# Patient Record
Sex: MALE | Race: WHITE | NOT HISPANIC OR LATINO | Employment: UNEMPLOYED | RURAL
[De-identification: names, ages, dates, MRNs, and addresses within clinical notes are randomized per-mention and may not be internally consistent; named-entity substitution may affect disease eponyms.]

---

## 2022-06-09 ENCOUNTER — HOSPITAL ENCOUNTER (INPATIENT)
Facility: HOSPITAL | Age: 37
LOS: 2 days | Discharge: HOME | DRG: 392 | End: 2022-06-12
Attending: EMERGENCY MEDICINE | Admitting: INTERNAL MEDICINE

## 2022-06-09 DIAGNOSIS — R07.9 CHEST PAIN: ICD-10-CM

## 2022-06-09 DIAGNOSIS — A09 INFECTIOUS DIARRHEA: ICD-10-CM

## 2022-06-09 DIAGNOSIS — A41.9 SEPSIS: ICD-10-CM

## 2022-06-09 DIAGNOSIS — F19.90 IVDU (INTRAVENOUS DRUG USER): ICD-10-CM

## 2022-06-09 DIAGNOSIS — R50.9 FEVER OF UNKNOWN ORIGIN: ICD-10-CM

## 2022-06-09 LAB
ALBUMIN SERPL BCP-MCNC: 3 G/DL (ref 3.5–5)
ALBUMIN/GLOB SERPL: 0.7 {RATIO}
ALP SERPL-CCNC: 152 U/L (ref 45–115)
ALT SERPL W P-5'-P-CCNC: 52 U/L (ref 16–61)
ANION GAP SERPL CALCULATED.3IONS-SCNC: 9 MMOL/L (ref 7–16)
APTT PPP: 29.2 SECONDS (ref 25.2–37.3)
AST SERPL W P-5'-P-CCNC: 33 U/L (ref 15–37)
BASOPHILS # BLD AUTO: 0.03 K/UL (ref 0–0.2)
BASOPHILS NFR BLD AUTO: 0.2 % (ref 0–1)
BILIRUB SERPL-MCNC: 0.4 MG/DL (ref 0–1.2)
BILIRUB UR QL STRIP: NEGATIVE
BUN SERPL-MCNC: 6 MG/DL (ref 7–18)
BUN/CREAT SERPL: 8 (ref 6–20)
CALCIUM SERPL-MCNC: 8.8 MG/DL (ref 8.5–10.1)
CHLORIDE SERPL-SCNC: 101 MMOL/L (ref 98–107)
CLARITY UR: CLEAR
CO2 SERPL-SCNC: 27 MMOL/L (ref 21–32)
COLOR UR: YELLOW
CREAT SERPL-MCNC: 0.79 MG/DL (ref 0.7–1.3)
CRP SERPL-MCNC: 8.34 MG/DL (ref 0–0.8)
DIFFERENTIAL METHOD BLD: ABNORMAL
EOSINOPHIL # BLD AUTO: 0.03 K/UL (ref 0–0.5)
EOSINOPHIL NFR BLD AUTO: 0.2 % (ref 1–4)
ERYTHROCYTE [DISTWIDTH] IN BLOOD BY AUTOMATED COUNT: 13.2 % (ref 11.5–14.5)
ERYTHROCYTE [SEDIMENTATION RATE] IN BLOOD BY WESTERGREN METHOD: 38 MM/HR (ref 0–15)
FLUAV AG UPPER RESP QL IA.RAPID: NEGATIVE
FLUBV AG UPPER RESP QL IA.RAPID: NEGATIVE
GLOBULIN SER-MCNC: 4.2 G/DL (ref 2–4)
GLUCOSE SERPL-MCNC: 143 MG/DL (ref 74–106)
GLUCOSE UR STRIP-MCNC: NEGATIVE MG/DL
HCT VFR BLD AUTO: 37.4 % (ref 40–54)
HGB BLD-MCNC: 12.8 G/DL (ref 13.5–18)
IMM GRANULOCYTES # BLD AUTO: 0.07 K/UL (ref 0–0.04)
IMM GRANULOCYTES NFR BLD: 0.6 % (ref 0–0.4)
INR BLD: 1.11 (ref 0.9–1.1)
KETONES UR STRIP-SCNC: NEGATIVE MG/DL
LACTATE SERPL-SCNC: 1.1 MMOL/L (ref 0.4–2)
LEUKOCYTE ESTERASE UR QL STRIP: NEGATIVE
LYMPHOCYTES # BLD AUTO: 0.9 K/UL (ref 1–4.8)
LYMPHOCYTES NFR BLD AUTO: 7.2 % (ref 27–41)
MAGNESIUM SERPL-MCNC: 1.6 MG/DL (ref 1.7–2.3)
MCH RBC QN AUTO: 26.3 PG (ref 27–31)
MCHC RBC AUTO-ENTMCNC: 34.2 G/DL (ref 32–36)
MCV RBC AUTO: 76.8 FL (ref 80–96)
MONOCYTES # BLD AUTO: 1.25 K/UL (ref 0–0.8)
MONOCYTES NFR BLD AUTO: 10 % (ref 2–6)
MPC BLD CALC-MCNC: 10 FL (ref 9.4–12.4)
NEUTROPHILS # BLD AUTO: 10.27 K/UL (ref 1.8–7.7)
NEUTROPHILS NFR BLD AUTO: 81.8 % (ref 53–65)
NITRITE UR QL STRIP: NEGATIVE
NRBC # BLD AUTO: 0 X10E3/UL
NRBC, AUTO (.00): 0 %
NT-PROBNP SERPL-MCNC: 66 PG/ML (ref 1–125)
PH UR STRIP: 6 PH UNITS
PLATELET # BLD AUTO: 293 K/UL (ref 150–400)
POTASSIUM SERPL-SCNC: 3.7 MMOL/L (ref 3.5–5.1)
PROT SERPL-MCNC: 7.2 G/DL (ref 6.4–8.2)
PROT UR QL STRIP: NEGATIVE
PROTHROMBIN TIME: 13.9 SECONDS (ref 11.7–14.7)
RBC # BLD AUTO: 4.87 M/UL (ref 4.6–6.2)
RBC # UR STRIP: NEGATIVE /UL
SARS-COV+SARS-COV-2 AG RESP QL IA.RAPID: NEGATIVE
SODIUM SERPL-SCNC: 133 MMOL/L (ref 136–145)
SP GR UR STRIP: <=1.005
TROPONIN I SERPL HS-MCNC: 8.9 PG/ML
UROBILINOGEN UR STRIP-ACNC: 0.2 MG/DL
WBC # BLD AUTO: 12.55 K/UL (ref 4.5–11)

## 2022-06-09 PROCEDURE — 85651 RBC SED RATE NONAUTOMATED: CPT | Performed by: EMERGENCY MEDICINE

## 2022-06-09 PROCEDURE — 85730 THROMBOPLASTIN TIME PARTIAL: CPT | Performed by: EMERGENCY MEDICINE

## 2022-06-09 PROCEDURE — 87040 BLOOD CULTURE FOR BACTERIA: CPT | Performed by: EMERGENCY MEDICINE

## 2022-06-09 PROCEDURE — 63600175 PHARM REV CODE 636 W HCPCS: Performed by: EMERGENCY MEDICINE

## 2022-06-09 PROCEDURE — 84484 ASSAY OF TROPONIN QUANT: CPT | Performed by: EMERGENCY MEDICINE

## 2022-06-09 PROCEDURE — 96375 TX/PRO/DX INJ NEW DRUG ADDON: CPT

## 2022-06-09 PROCEDURE — 25000003 PHARM REV CODE 250: Performed by: EMERGENCY MEDICINE

## 2022-06-09 PROCEDURE — 83605 ASSAY OF LACTIC ACID: CPT | Performed by: EMERGENCY MEDICINE

## 2022-06-09 PROCEDURE — 80053 COMPREHEN METABOLIC PANEL: CPT | Performed by: EMERGENCY MEDICINE

## 2022-06-09 PROCEDURE — 83880 ASSAY OF NATRIURETIC PEPTIDE: CPT | Performed by: EMERGENCY MEDICINE

## 2022-06-09 PROCEDURE — 85610 PROTHROMBIN TIME: CPT | Performed by: EMERGENCY MEDICINE

## 2022-06-09 PROCEDURE — 93005 ELECTROCARDIOGRAM TRACING: CPT

## 2022-06-09 PROCEDURE — 36415 COLL VENOUS BLD VENIPUNCTURE: CPT | Performed by: EMERGENCY MEDICINE

## 2022-06-09 PROCEDURE — 93010 EKG 12-LEAD: ICD-10-PCS | Mod: ,,, | Performed by: INTERNAL MEDICINE

## 2022-06-09 PROCEDURE — 99285 EMERGENCY DEPT VISIT HI MDM: CPT | Mod: 25

## 2022-06-09 PROCEDURE — 96374 THER/PROPH/DIAG INJ IV PUSH: CPT

## 2022-06-09 PROCEDURE — 93010 ELECTROCARDIOGRAM REPORT: CPT | Mod: ,,, | Performed by: INTERNAL MEDICINE

## 2022-06-09 PROCEDURE — 99284 EMERGENCY DEPT VISIT MOD MDM: CPT | Mod: ,,, | Performed by: EMERGENCY MEDICINE

## 2022-06-09 PROCEDURE — 81003 URINALYSIS AUTO W/O SCOPE: CPT | Performed by: EMERGENCY MEDICINE

## 2022-06-09 PROCEDURE — 99284 PR EMERGENCY DEPT VISIT,LEVEL IV: ICD-10-PCS | Mod: ,,, | Performed by: EMERGENCY MEDICINE

## 2022-06-09 PROCEDURE — 87428 SARSCOV & INF VIR A&B AG IA: CPT | Performed by: EMERGENCY MEDICINE

## 2022-06-09 PROCEDURE — 96361 HYDRATE IV INFUSION ADD-ON: CPT

## 2022-06-09 PROCEDURE — 83735 ASSAY OF MAGNESIUM: CPT | Performed by: EMERGENCY MEDICINE

## 2022-06-09 PROCEDURE — 86140 C-REACTIVE PROTEIN: CPT | Performed by: EMERGENCY MEDICINE

## 2022-06-09 PROCEDURE — 85025 COMPLETE CBC W/AUTO DIFF WBC: CPT | Performed by: EMERGENCY MEDICINE

## 2022-06-09 RX ORDER — ONDANSETRON 2 MG/ML
4 INJECTION INTRAMUSCULAR; INTRAVENOUS
Status: COMPLETED | OUTPATIENT
Start: 2022-06-09 | End: 2022-06-09

## 2022-06-09 RX ORDER — MORPHINE SULFATE 4 MG/ML
4 INJECTION, SOLUTION INTRAMUSCULAR; INTRAVENOUS
Status: COMPLETED | OUTPATIENT
Start: 2022-06-09 | End: 2022-06-09

## 2022-06-09 RX ORDER — ACETAMINOPHEN 500 MG
1000 TABLET ORAL
Status: COMPLETED | OUTPATIENT
Start: 2022-06-09 | End: 2022-06-09

## 2022-06-09 RX ADMIN — ACETAMINOPHEN 1000 MG: 500 TABLET ORAL at 09:06

## 2022-06-09 RX ADMIN — ONDANSETRON 4 MG: 2 INJECTION INTRAMUSCULAR; INTRAVENOUS at 09:06

## 2022-06-09 RX ADMIN — SODIUM CHLORIDE 1000 ML: 9 INJECTION, SOLUTION INTRAVENOUS at 09:06

## 2022-06-09 RX ADMIN — MORPHINE SULFATE 4 MG: 4 INJECTION INTRAVENOUS at 09:06

## 2022-06-10 PROBLEM — D64.9 ANEMIA: Status: ACTIVE | Noted: 2022-06-10

## 2022-06-10 PROBLEM — E83.42 HYPOMAGNESEMIA: Status: ACTIVE | Noted: 2022-06-10

## 2022-06-10 PROBLEM — R19.7 DIARRHEA: Status: ACTIVE | Noted: 2022-06-10

## 2022-06-10 PROBLEM — R50.9 FEVER OF UNKNOWN ORIGIN: Status: ACTIVE | Noted: 2022-06-10

## 2022-06-10 PROBLEM — F19.90 IVDU (INTRAVENOUS DRUG USER): Status: ACTIVE | Noted: 2022-06-10

## 2022-06-10 LAB
AMPHET UR QL SCN: NEGATIVE
ANION GAP SERPL CALCULATED.3IONS-SCNC: 15 MMOL/L (ref 7–16)
AORTIC ROOT ANNULUS: 3 CM
AORTIC VALVE CUSP SEPERATION: 25.7 CM
AV INDEX (PROSTH): 0.73
AV MEAN GRADIENT: 5 MMHG
AV PEAK GRADIENT: 8 MMHG
AV VALVE AREA: 2.76 CM2
AV VELOCITY RATIO: 0.79
BARBITURATES UR QL SCN: NEGATIVE
BASOPHILS # BLD AUTO: 0.03 K/UL (ref 0–0.2)
BASOPHILS NFR BLD AUTO: 0.3 % (ref 0–1)
BENZODIAZ METAB UR QL SCN: NEGATIVE
BSA FOR ECHO PROCEDURE: 2.06 M2
BUN SERPL-MCNC: 8 MG/DL (ref 7–18)
BUN/CREAT SERPL: 12 (ref 6–20)
C COLI+JEJ+UPSA DNA STL QL NAA+NON-PROBE: POSITIVE
CALCIUM SERPL-MCNC: 8.7 MG/DL (ref 8.5–10.1)
CANNABINOIDS UR QL SCN: NEGATIVE
CHLORIDE SERPL-SCNC: 99 MMOL/L (ref 98–107)
CO2 SERPL-SCNC: 24 MMOL/L (ref 21–32)
COCAINE UR QL SCN: NEGATIVE
CREAT SERPL-MCNC: 0.68 MG/DL (ref 0.7–1.3)
CV ECHO LV RWT: 0.48 CM
DIFFERENTIAL METHOD BLD: ABNORMAL
DOP CALC AO PEAK VEL: 1.4 M/S
DOP CALC AO VTI: 22 CM
DOP CALC LVOT AREA: 3.8 CM2
DOP CALC LVOT DIAMETER: 2.2 CM
DOP CALC LVOT PEAK VEL: 1.1 M/S
DOP CALC LVOT STROKE VOLUME: 60.79 CM3
DOP CALCLVOT PEAK VEL VTI: 16 CM
E COLI SXT1 STL QL IA: NEGATIVE
E COLI SXT2 STL QL IA: NEGATIVE
E WAVE DECELERATION TIME: 163 MSEC
ECHO EF ESTIMATED: 55 %
ECHO LV POSTERIOR WALL: 1.17 CM (ref 0.6–1.1)
EJECTION FRACTION: 55 %
EOSINOPHIL # BLD AUTO: 0.01 K/UL (ref 0–0.5)
EOSINOPHIL NFR BLD AUTO: 0.1 % (ref 1–4)
ERYTHROCYTE [DISTWIDTH] IN BLOOD BY AUTOMATED COUNT: 13.2 % (ref 11.5–14.5)
FECAL LEUKOCYTES: NORMAL /HPF
FRACTIONAL SHORTENING: 26 % (ref 28–44)
GLUCOSE SERPL-MCNC: 159 MG/DL (ref 70–105)
GLUCOSE SERPL-MCNC: 213 MG/DL (ref 70–105)
GLUCOSE SERPL-MCNC: 245 MG/DL (ref 74–106)
GLUCOSE SERPL-MCNC: 286 MG/DL (ref 70–105)
HCT VFR BLD AUTO: 36.7 % (ref 40–54)
HGB BLD-MCNC: 12.2 G/DL (ref 13.5–18)
IMM GRANULOCYTES # BLD AUTO: 0.04 K/UL (ref 0–0.04)
IMM GRANULOCYTES NFR BLD: 0.4 % (ref 0–0.4)
INTERVENTRICULAR SEPTUM: 1.04 CM (ref 0.6–1.1)
IVC OSTIUM: 1.4 CM
LEFT ATRIUM SIZE: 3.7 CM
LEFT INTERNAL DIMENSION IN SYSTOLE: 3.59 CM (ref 2.1–4)
LEFT VENTRICLE DIASTOLIC VOLUME INDEX: 55.02 ML/M2
LEFT VENTRICLE DIASTOLIC VOLUME: 111.7 ML
LEFT VENTRICLE MASS INDEX: 99 G/M2
LEFT VENTRICLE SYSTOLIC VOLUME INDEX: 26.7 ML/M2
LEFT VENTRICLE SYSTOLIC VOLUME: 54.1 ML
LEFT VENTRICULAR INTERNAL DIMENSION IN DIASTOLE: 4.88 CM (ref 3.5–6)
LEFT VENTRICULAR MASS: 200.44 G
LVOT MG: 3 MMHG
LYMPHOCYTES # BLD AUTO: 0.96 K/UL (ref 1–4.8)
LYMPHOCYTES NFR BLD AUTO: 10.6 % (ref 27–41)
MCH RBC QN AUTO: 25.7 PG (ref 27–31)
MCHC RBC AUTO-ENTMCNC: 33.2 G/DL (ref 32–36)
MCV RBC AUTO: 77.4 FL (ref 80–96)
MICROCYTES BLD QL SMEAR: ABNORMAL
MONOCYTES # BLD AUTO: 0.71 K/UL (ref 0–0.8)
MONOCYTES NFR BLD AUTO: 7.8 % (ref 2–6)
MPC BLD CALC-MCNC: 11 FL (ref 9.4–12.4)
MV PEAK E VEL: 0.78 M/S
NEUTROPHILS # BLD AUTO: 7.32 K/UL (ref 1.8–7.7)
NEUTROPHILS NFR BLD AUTO: 80.8 % (ref 53–65)
NOROVIRUS GI+II RNA STL QL NAA+NON-PROBE: NEGATIVE
NRBC # BLD AUTO: 0 X10E3/UL
NRBC, AUTO (.00): 0 %
OPIATES UR QL SCN: POSITIVE
OVALOCYTES BLD QL SMEAR: ABNORMAL
PCP UR QL SCN: NEGATIVE
PLATELET # BLD AUTO: 222 K/UL (ref 150–400)
PLATELET MORPHOLOGY: ABNORMAL
POTASSIUM SERPL-SCNC: 4.1 MMOL/L (ref 3.5–5.1)
RA MAJOR: 3.2 CM
RA PRESSURE: 3 MMHG
RBC # BLD AUTO: 4.74 M/UL (ref 4.6–6.2)
RIGHT VENTRICULAR END-DIASTOLIC DIMENSION: 3.8 CM
RVA RNA STL QL NAA+NON-PROBE: NEGATIVE
S ENT+BONG DNA STL QL NAA+NON-PROBE: NEGATIVE
SHIGELLA SPECIES NAT: NEGATIVE
SODIUM SERPL-SCNC: 134 MMOL/L (ref 136–145)
TRICUSPID ANNULAR PLANE SYSTOLIC EXCURSION: 2.5 CM
V CHOL+PARA+VUL DNA STL QL NAA+NON-PROBE: NEGATIVE
WBC # BLD AUTO: 9.07 K/UL (ref 4.5–11)
Y ENTEROCOL DNA STL QL NAA+NON-PROBE: NEGATIVE

## 2022-06-10 PROCEDURE — 63700000 PHARM REV CODE 250 ALT 637 W/O HCPCS

## 2022-06-10 PROCEDURE — 36415 COLL VENOUS BLD VENIPUNCTURE: CPT | Performed by: INTERNAL MEDICINE

## 2022-06-10 PROCEDURE — 80307 DRUG TEST PRSMV CHEM ANLYZR: CPT | Performed by: INTERNAL MEDICINE

## 2022-06-10 PROCEDURE — 63600175 PHARM REV CODE 636 W HCPCS: Performed by: INTERNAL MEDICINE

## 2022-06-10 PROCEDURE — 87591 N.GONORRHOEAE DNA AMP PROB: CPT | Performed by: INTERNAL MEDICINE

## 2022-06-10 PROCEDURE — 94761 N-INVAS EAR/PLS OXIMETRY MLT: CPT

## 2022-06-10 PROCEDURE — 99222 PR INITIAL HOSPITAL CARE,LEVL II: ICD-10-PCS | Mod: GC,,, | Performed by: INTERNAL MEDICINE

## 2022-06-10 PROCEDURE — 89055 LEUKOCYTE ASSESSMENT FECAL: CPT | Performed by: INTERNAL MEDICINE

## 2022-06-10 PROCEDURE — 63600175 PHARM REV CODE 636 W HCPCS

## 2022-06-10 PROCEDURE — 25000003 PHARM REV CODE 250

## 2022-06-10 PROCEDURE — 25000003 PHARM REV CODE 250: Performed by: INTERNAL MEDICINE

## 2022-06-10 PROCEDURE — 87040 BLOOD CULTURE FOR BACTERIA: CPT | Performed by: INTERNAL MEDICINE

## 2022-06-10 PROCEDURE — 87506 IADNA-DNA/RNA PROBE TQ 6-11: CPT | Performed by: INTERNAL MEDICINE

## 2022-06-10 PROCEDURE — 85025 COMPLETE CBC W/AUTO DIFF WBC: CPT | Performed by: INTERNAL MEDICINE

## 2022-06-10 PROCEDURE — 82962 GLUCOSE BLOOD TEST: CPT

## 2022-06-10 PROCEDURE — 80048 BASIC METABOLIC PNL TOTAL CA: CPT | Performed by: INTERNAL MEDICINE

## 2022-06-10 PROCEDURE — 87493 C DIFF AMPLIFIED PROBE: CPT

## 2022-06-10 PROCEDURE — 11000001 HC ACUTE MED/SURG PRIVATE ROOM

## 2022-06-10 PROCEDURE — 63600175 PHARM REV CODE 636 W HCPCS: Performed by: EMERGENCY MEDICINE

## 2022-06-10 PROCEDURE — 99222 1ST HOSP IP/OBS MODERATE 55: CPT | Mod: GC,,, | Performed by: INTERNAL MEDICINE

## 2022-06-10 PROCEDURE — 25000003 PHARM REV CODE 250: Performed by: EMERGENCY MEDICINE

## 2022-06-10 PROCEDURE — 87491 CHLMYD TRACH DNA AMP PROBE: CPT | Performed by: INTERNAL MEDICINE

## 2022-06-10 PROCEDURE — 87045 FECES CULTURE AEROBIC BACT: CPT | Performed by: INTERNAL MEDICINE

## 2022-06-10 RX ORDER — INSULIN ASPART 100 [IU]/ML
0-5 INJECTION, SOLUTION INTRAVENOUS; SUBCUTANEOUS
Status: DISCONTINUED | OUTPATIENT
Start: 2022-06-10 | End: 2022-06-12 | Stop reason: HOSPADM

## 2022-06-10 RX ORDER — NALOXONE HCL 0.4 MG/ML
0.02 VIAL (ML) INJECTION
Status: DISCONTINUED | OUTPATIENT
Start: 2022-06-10 | End: 2022-06-12 | Stop reason: HOSPADM

## 2022-06-10 RX ORDER — MUPIROCIN 20 MG/G
OINTMENT TOPICAL 2 TIMES DAILY
Status: DISCONTINUED | OUTPATIENT
Start: 2022-06-10 | End: 2022-06-12 | Stop reason: HOSPADM

## 2022-06-10 RX ORDER — SODIUM CHLORIDE 0.9 % (FLUSH) 0.9 %
10 SYRINGE (ML) INJECTION EVERY 12 HOURS PRN
Status: DISCONTINUED | OUTPATIENT
Start: 2022-06-10 | End: 2022-06-12 | Stop reason: HOSPADM

## 2022-06-10 RX ORDER — IBUPROFEN 200 MG
24 TABLET ORAL
Status: DISCONTINUED | OUTPATIENT
Start: 2022-06-10 | End: 2022-06-12 | Stop reason: HOSPADM

## 2022-06-10 RX ORDER — SODIUM CHLORIDE 9 MG/ML
INJECTION, SOLUTION INTRAVENOUS CONTINUOUS
Status: DISCONTINUED | OUTPATIENT
Start: 2022-06-10 | End: 2022-06-12 | Stop reason: HOSPADM

## 2022-06-10 RX ORDER — LACTOBACILLUS ACIDOPHILUS 500MM CELL
3 CAPSULE ORAL
Status: DISCONTINUED | OUTPATIENT
Start: 2022-06-10 | End: 2022-06-12 | Stop reason: HOSPADM

## 2022-06-10 RX ORDER — TALC
6 POWDER (GRAM) TOPICAL NIGHTLY PRN
Status: DISCONTINUED | OUTPATIENT
Start: 2022-06-10 | End: 2022-06-12 | Stop reason: HOSPADM

## 2022-06-10 RX ORDER — LORAZEPAM 2 MG/ML
1 INJECTION INTRAMUSCULAR ONCE AS NEEDED
Status: DISCONTINUED | OUTPATIENT
Start: 2022-06-10 | End: 2022-06-12 | Stop reason: HOSPADM

## 2022-06-10 RX ORDER — ONDANSETRON 2 MG/ML
4 INJECTION INTRAMUSCULAR; INTRAVENOUS EVERY 8 HOURS PRN
Status: DISCONTINUED | OUTPATIENT
Start: 2022-06-10 | End: 2022-06-12 | Stop reason: HOSPADM

## 2022-06-10 RX ORDER — HYDROCODONE BITARTRATE AND ACETAMINOPHEN 5; 325 MG/1; MG/1
1 TABLET ORAL EVERY 6 HOURS PRN
Status: DISCONTINUED | OUTPATIENT
Start: 2022-06-10 | End: 2022-06-11

## 2022-06-10 RX ORDER — POLYETHYLENE GLYCOL 3350 17 G/17G
17 POWDER, FOR SOLUTION ORAL DAILY PRN
Status: DISCONTINUED | OUTPATIENT
Start: 2022-06-10 | End: 2022-06-12 | Stop reason: HOSPADM

## 2022-06-10 RX ORDER — ACETAMINOPHEN 325 MG/1
650 TABLET ORAL EVERY 4 HOURS PRN
Status: DISCONTINUED | OUTPATIENT
Start: 2022-06-10 | End: 2022-06-12 | Stop reason: HOSPADM

## 2022-06-10 RX ORDER — MAGNESIUM SULFATE HEPTAHYDRATE 40 MG/ML
2 INJECTION, SOLUTION INTRAVENOUS
Status: COMPLETED | OUTPATIENT
Start: 2022-06-10 | End: 2022-06-10

## 2022-06-10 RX ORDER — HYDROCODONE BITARTRATE AND ACETAMINOPHEN 10; 325 MG/1; MG/1
1 TABLET ORAL EVERY 6 HOURS PRN
Status: DISCONTINUED | OUTPATIENT
Start: 2022-06-10 | End: 2022-06-11

## 2022-06-10 RX ORDER — AZITHROMYCIN 250 MG/1
500 TABLET, FILM COATED ORAL DAILY
Status: COMPLETED | OUTPATIENT
Start: 2022-06-10 | End: 2022-06-12

## 2022-06-10 RX ORDER — GLUCAGON 1 MG
1 KIT INJECTION
Status: DISCONTINUED | OUTPATIENT
Start: 2022-06-10 | End: 2022-06-12 | Stop reason: HOSPADM

## 2022-06-10 RX ORDER — IBUPROFEN 200 MG
16 TABLET ORAL
Status: DISCONTINUED | OUTPATIENT
Start: 2022-06-10 | End: 2022-06-12 | Stop reason: HOSPADM

## 2022-06-10 RX ADMIN — SODIUM CHLORIDE: 9 INJECTION, SOLUTION INTRAVENOUS at 05:06

## 2022-06-10 RX ADMIN — AZITHROMYCIN MONOHYDRATE 500 MG: 250 TABLET ORAL at 08:06

## 2022-06-10 RX ADMIN — SODIUM CHLORIDE: 9 INJECTION, SOLUTION INTRAVENOUS at 08:06

## 2022-06-10 RX ADMIN — VANCOMYCIN HYDROCHLORIDE 1500 MG: 1 INJECTION, POWDER, LYOPHILIZED, FOR SOLUTION INTRAVENOUS at 12:06

## 2022-06-10 RX ADMIN — Medication 3 CAPSULE: at 05:06

## 2022-06-10 RX ADMIN — PIPERACILLIN AND TAZOBACTAM 4.5 G: 4; .5 INJECTION, POWDER, LYOPHILIZED, FOR SOLUTION INTRAVENOUS at 11:06

## 2022-06-10 RX ADMIN — ACETAMINOPHEN 650 MG: 325 TABLET ORAL at 03:06

## 2022-06-10 RX ADMIN — MAGNESIUM SULFATE HEPTAHYDRATE 2 G: 40 INJECTION, SOLUTION INTRAVENOUS at 02:06

## 2022-06-10 RX ADMIN — INSULIN ASPART 3 UNITS: 100 INJECTION, SOLUTION INTRAVENOUS; SUBCUTANEOUS at 04:06

## 2022-06-10 RX ADMIN — PIPERACILLIN AND TAZOBACTAM 4.5 G: 4; .5 INJECTION, POWDER, LYOPHILIZED, FOR SOLUTION INTRAVENOUS at 07:06

## 2022-06-10 RX ADMIN — MUPIROCIN: 20 OINTMENT TOPICAL at 08:06

## 2022-06-10 RX ADMIN — VANCOMYCIN HYDROCHLORIDE 1750 MG: 1 INJECTION, POWDER, LYOPHILIZED, FOR SOLUTION INTRAVENOUS at 08:06

## 2022-06-10 RX ADMIN — ONDANSETRON 4 MG: 2 INJECTION INTRAMUSCULAR; INTRAVENOUS at 08:06

## 2022-06-10 RX ADMIN — ACETAMINOPHEN 650 MG: 325 TABLET ORAL at 07:06

## 2022-06-10 RX ADMIN — PIPERACILLIN AND TAZOBACTAM 4.5 G: 4; .5 INJECTION, POWDER, LYOPHILIZED, FOR SOLUTION INTRAVENOUS at 02:06

## 2022-06-10 RX ADMIN — HYDROCODONE BITARTRATE AND ACETAMINOPHEN 1 TABLET: 5; 325 TABLET ORAL at 05:06

## 2022-06-10 NOTE — HPI
36 y.o. male with a PMHx of tinea versicolor presented to the [facility] ED c/o fever, chills, watery diarrhea, diaphoresis, and myalgias since 2-3 days. Pt reports chest pain and generalized abdominal pain. Pt states associated symptoms of nausea. Per patient, he was cleaning someone's house and accidentally pricked himself with an old epi pen needle in his right thumb. Pt reports a history of IV drug use and reports he has used meth for the past 2 years and last time he used IV drugs was 2 weeks ago. Pt reports he has broken a needle inside his right arm and left hand which he could not take out. Pt reports he was tested positive for hepatitis C in the past and on repeat testing was told he is false positive. Pt denies any dysuria, hematemesis, hematuria, skin infections, rash, or any other complaints at this time.     In the ED, pt was treated with IV fluids NS 1L bolus, tylenol for fever, IV morphine 4 mg once, IV zofran 4 mg, and IV vancomycin X1. UA and chest xray were negative.  ESR, CRP, and WBC elevated. Pt's vitals and suspected source of infection showed that patient meets sepsis criteria. Pt will be admitted to the hospital for further evaluation and management of his fever of unknown origin.

## 2022-06-10 NOTE — NURSING
Pt voiced concerns about drug use, expressed shame. Informed pt a  can discuss available resources/rehab services whenever he is ready to talk. Pt resting in bed with significant other at bedside.

## 2022-06-10 NOTE — ASSESSMENT & PLAN NOTE
-reports watery diarrhea with no blood  -fecal leukocytes, enteric pathogen panel, and stool culture pending.   -IV fluids NS at 100 cc/hr

## 2022-06-10 NOTE — ASSESSMENT & PLAN NOTE
-UDS positive for opiates.  -reports last used IV meth 2 weeks ago and the needle broke off in his right arm and also his left hand. xrays pending.   -ESR, CRP, and WBC elevated.  -echo, hepatitis panel, HIV, RPR, Marlon/chl pending.    -CT chest and CT abd/pelvis w/o contrast pending to look for abscesses.  -ID consulted. Assistance appreciated.

## 2022-06-10 NOTE — PLAN OF CARE
[facility]  Initial Discharge Assessment       Primary Care Provider: Primary Doctor No    Admission Diagnosis: Chest pain [R07.9]  Fever of unknown origin [R50.9]  Sepsis [A41.9]    Admission Date: 6/9/2022  Expected Discharge Date:     Discharge Barriers Identified: None    Payor: /     Extended Emergency Contact Information  Primary Emergency Contact: Socorro Aaron  Mobile Phone: [phone]  Relation: Mother  Preferred language: English   needed? No    Discharge Plan A: Home with family  Discharge Plan B: Home with family    No Pharmacies Listed    Initial Assessment (most recent)     Adult Discharge Assessment - 06/10/22 1117        Discharge Assessment    Assessment Type Discharge Planning Assessment     Confirmed/corrected address, phone number and insurance Yes     Source of Information patient     Communicated RAMON with patient/caregiver Date not available/Unable to determine     Lives With significant other     Do you expect to return to your current living situation? Yes     Do you have help at home or someone to help you manage your care at home? Yes     Prior to hospitilization cognitive status: Unable to Assess     Current cognitive status: Alert/Oriented     Walking or Climbing Stairs Difficulty none     Dressing/Bathing Difficulty none     Equipment Currently Used at Home none     Do you currently have service(s) that help you manage your care at home? No     How do you get to doctors appointments? car, drives self     Are you on dialysis? No     Discharge Plan A Home with family     Discharge Plan B Home with family     DME Needed Upon Discharge  none     Discharge Plan discussed with: Patient     Discharge Barriers Identified None               Consult for rehab. Ss spoke with pt and pt lives at home with friend and plans t oreturn at d/c. Pt stated he has no ins at this time. Pt stated he does not need rehab at this time. Ss followng.

## 2022-06-10 NOTE — CARE UPDATE
Patient looked septic this AM. IV vanc and zosyn were initiated right away, Remainder of his normal saline which was more than 500 cc was given to him as a bolus and then he was initiated on 200 cc/hr Nacl. Xrays came back and showed a broken needle in the right forearm and a broken needle in the left thumb base. Echo showed no endocarditis.  After the interventions patient started feeling slightly better, tachycardia improved to 103, he was able to eat his breakfast fully. Will monitor the patient closely and check vitals often.

## 2022-06-10 NOTE — ED PROVIDER NOTES
Encounter Date: 2022    SCRIBE #1 NOTE: I, Arabella Davy, am scribing for, and in the presence of,  Rony Warren MD. I have scribed the entire note.       History     Chief Complaint   Patient presents with    Fever     Patient is a 36 year old male who presents to the emergency department complaining of nausea, a fever, sweats, and chest pain. Patient's mother explains that the the patient was stuck by a dirty EPI pen needle 3 days ago and has been experiencing these symptoms since. Patient denies any vomiting. Patient reports that he had a history of injecting drugs but explains that he has not done so in 3 weeks. Patient has not been vaccinated against COVID-19. No other symptoms were reported.     The history is provided by the patient and a parent. No  was used.     Review of patient's allergies indicates:  No Known Allergies  Past Medical History:   Diagnosis Date    Tinea versicolor      History reviewed. No pertinent surgical history.  Family History   Problem Relation Age of Onset    Graves' disease Mother     Kidney failure Maternal Grandmother      Social History     Tobacco Use    Smoking status: Former Smoker     Packs/day: 1.00     Years: 10.00     Pack years: 10.00     Types: Cigarettes     Quit date: 2021     Years since quittin.0    Smokeless tobacco: Never Used   Substance Use Topics    Alcohol use: Not Currently    Drug use: Yes     Types: IV, Methamphetamines     Review of Systems   Constitutional: Positive for diaphoresis and fever.   HENT: Negative.    Eyes: Negative.    Respiratory: Positive for cough (mild, dry).    Cardiovascular: Positive for chest pain and palpitations.   Gastrointestinal: Positive for nausea. Negative for vomiting.   Endocrine: Negative.    Genitourinary: Negative.    Musculoskeletal: Negative.    Skin: Negative.    Allergic/Immunologic: Negative.    Neurological: Negative.    Hematological: Negative.    Psychiatric/Behavioral:  Negative.    All other systems reviewed and are negative.      Physical Exam     Initial Vitals [06/09/22 2054]   BP Pulse Resp Temp SpO2   (!) 153/81 (!) 125 (!) 24 (!) 101.4 °F (38.6 °C) 98 %      MAP       --         Physical Exam    Nursing note and vitals reviewed.  Constitutional: He appears well-developed and well-nourished.   HENT:   Head: Normocephalic and atraumatic.   Eyes: Conjunctivae and EOM are normal. Pupils are equal, round, and reactive to light.   Neck: Neck supple.   Normal range of motion.  Cardiovascular: Normal rate, regular rhythm, normal heart sounds and intact distal pulses.   Pulmonary/Chest: Breath sounds normal.   Abdominal: Abdomen is soft. Bowel sounds are normal.   Musculoskeletal:         General: Normal range of motion.      Cervical back: Normal range of motion and neck supple.     Neurological: He is alert and oriented to person, place, and time. He has normal strength.   Skin: Skin is warm and dry. Capillary refill takes less than 2 seconds.   Psychiatric: He has a normal mood and affect. Thought content normal.         ED Course   Procedures  Labs Reviewed   COMPREHENSIVE METABOLIC PANEL - Abnormal; Notable for the following components:       Result Value    Sodium 133 (*)     Glucose 143 (*)     BUN 6 (*)     Albumin 3.0 (*)     Globulin 4.2 (*)     Alk Phos 152 (*)     All other components within normal limits   CBC WITH DIFFERENTIAL - Abnormal; Notable for the following components:    WBC 12.55 (*)     Hemoglobin 12.8 (*)     Hematocrit 37.4 (*)     MCV 76.8 (*)     MCH 26.3 (*)     Neutrophils % 81.8 (*)     Lymphocytes % 7.2 (*)     Monocytes % 10.0 (*)     Eosinophils % 0.2 (*)     Immature Granulocytes % 0.6 (*)     Neutrophils, Abs 10.27 (*)     Lymphocytes, Absolute 0.90 (*)     Monocytes, Absolute 1.25 (*)     Immature Granulocytes, Absolute 0.07 (*)     All other components within normal limits   SEDIMENTATION RATE, AUTOMATED - Abnormal; Notable for the following  components:    ESR Westergren 38 (*)     All other components within normal limits   C-REACTIVE PROTEIN - Abnormal; Notable for the following components:    CRP 8.34 (*)     All other components within normal limits   PROTIME-INR - Abnormal; Notable for the following components:    INR 1.11 (*)     All other components within normal limits   MAGNESIUM - Abnormal; Notable for the following components:    Magnesium 1.6 (*)     All other components within normal limits   URINALYSIS, REFLEX TO URINE CULTURE - Normal   SARS-COV2 (COVID) W/ FLU ANTIGEN - Normal    Narrative:     Negative SARS-CoV results should not be used as the sole basis for treatment or patient management decisions; negative results should be considered in the context of a patient's recent exposures, history and the presene of clinical signs and symptoms consistent with COVID-19.  Negative results should be treated as presumptive and confirmed by molecular assay, if necessary for patient management.   NT-PRO NATRIURETIC PEPTIDE - Normal   APTT - Normal   LACTIC ACID, PLASMA - Normal   TROPONIN I - Normal   CBC W/ AUTO DIFFERENTIAL    Narrative:     The following orders were created for panel order CBC auto differential.  Procedure                               Abnormality         Status                     ---------                               -----------         ------                     CBC with Differential[[id number]]        Abnormal            Final result                 Please view results for these tests on the individual orders.        ECG Results          EKG 12-Lead (Final result)  Result time 06/11/22 03:17:39    Final result by Interface, Lab In [facility] (06/11/22 03:17:39)                 Narrative:    Test Reason : R07.9,    Vent. Rate : 120 BPM     Atrial Rate : 000 BPM     P-R Int : 108 ms          QRS Dur : 100 ms      QT Int : 296 ms       P-R-T Axes : 066 073 092 degrees     QTc Int : 401 ms    Sinus tachycardia  Short ID  interval  Left ventricular hypertrophy  Abnormal ECG    Confirmed by Constantin Mar MD ([id number]) on 6/11/2022 3:17:27 AM    Referred By: [email or web address]   SELF           Confirmed By:Constantin Mar MD                            Imaging Results          X - Ray Chest AP Portable (Final result)  Result time 06/10/22 07:36:05    Final result by Harley Garza II, MD (06/10/22 07:36:05)                 Impression:      No evidence of cardiopulmonary disease.      Electronically signed by: Harley Garza  Date:    06/10/2022  Time:    07:36             Narrative:    EXAMINATION:  XR CHEST AP PORTABLE    CLINICAL HISTORY:  CHEST; pain    COMPARISON:  23 February 2018    FINDINGS:  The heart and mediastinum are normal in size and configuration.  The pulmonary vascularity is normal in caliber.  No lung infiltrates, effusions, pneumothorax or other abnormality is demonstrated.                                 Medications   acetaminophen tablet 1,000 mg (1,000 mg Oral Given 6/9/22 2120)   sodium chloride 0.9% bolus 1,000 mL (0 mLs Intravenous Stopped 6/9/22 2220)   ondansetron injection 4 mg (4 mg Intravenous Given 6/9/22 2120)   morphine injection 4 mg (4 mg Intravenous Given 6/9/22 2119)   vancomycin (VANCOCIN) 1,500 mg in dextrose 5 % 250 mL IVPB (0 mg Intravenous Stopped 6/10/22 0149)   magnesium sulfate 2g in water 50mL IVPB (premix) (0 g Intravenous Stopped 6/10/22 0437)   azithromycin tablet 500 mg (500 mg Oral Given 6/12/22 0936)   potassium chloride SA CR tablet 40 mEq (40 mEq Oral Given 6/12/22 0936)                Attending Attestation:           Physician Attestation for Scribe:  Physician Attestation Statement for Scribe #1: I, Rony Warren MD, reviewed documentation, as scribed by Arabella Bhatti in my presence, and it is both accurate and complete.                      Clinical Impression:   Final diagnoses:  [R07.9] Chest pain  [R50.9] Fever of unknown origin  [A41.9] Sepsis          ED Disposition Condition    Admit                Rony Warren MD  06/22/22 3843

## 2022-06-10 NOTE — ED TRIAGE NOTES
PATIENT PRESENTS TO ER WITH COMPLAINT OF HAVING A NEEDLE STICK 3 DAYS AGO BY EPI PEN WHEN HE WAS CLEANING UP TRASH. APPEARS DIAPHORETIC ON INITIAL TRIAGE. DOES ADMIT TO IV DRUG USE. HAS TWO SPOTS ON RIGHT ARM FROM PREVIOUS ATTEMPTS TO SHOOT UP BUT WAS SUCCESSFUL

## 2022-06-10 NOTE — H&P
[address] [facility]  History & Physical    Patient Name: Josh Aaron  MRN: [id number]  Patient Class: IP- Inpatient  Admission Date: 6/9/2022  Attending Physician: Fabiola Fitch,*   Primary Care Provider: Primary Doctor No         Patient information was obtained from patient, relative(s) and ER records.     Subjective:     Principal Problem:Fever of unknown origin    Chief Complaint:   Chief Complaint   Patient presents with    Fever        HPI: 36 y.o. male with a PMHx of tinea versicolor presented to the [facility] ED c/o fever, chills, watery diarrhea, diaphoresis, and myalgias since 2-3 days. Pt reports chest pain and generalized abdominal pain. Pt states associated symptoms of nausea. Per patient, he was cleaning someone's house and accidentally pricked himself with an old epi pen needle in his right thumb. Pt reports a history of IV drug use and reports he has used meth for the past 2 years and last time he used IV drugs was 2 weeks ago. Pt reports he has broken a needle inside his right arm and left hand which he could not take out. Pt reports he was tested positive for hepatitis C in the past and on repeat testing was told he is false positive. Pt denies any dysuria, hematemesis, hematuria, skin infections, rash, or any other complaints at this time.     In the ED, pt was treated with IV fluids NS 1L bolus, tylenol for fever, IV morphine 4 mg once, IV zofran 4 mg, and IV vancomycin X1. UA and chest xray were negative.  ESR, CRP, and WBC elevated. Pt's vitals and suspected source of infection showed that patient meets sepsis criteria. Pt will be admitted to the hospital for further evaluation and management of his fever of unknown origin.       Past Medical History:   Diagnosis Date    Tinea versicolor        History reviewed. No pertinent surgical history.    Review of patient's allergies indicates:  No Known Allergies    No current facility-administered  medications on file prior to encounter.     No current outpatient medications on file prior to encounter.     Family History       Problem Relation (Age of Onset)    Graves' disease Mother    Kidney failure Maternal Grandmother          Tobacco Use    Smoking status: Former Smoker     Packs/day: 1.00     Years: 10.00     Pack years: 10.00     Types: Cigarettes     Quit date: 2021     Years since quittin.0    Smokeless tobacco: Never Used   Substance and Sexual Activity    Alcohol use: Not Currently    Drug use: Yes     Types: IV, Methamphetamines    Sexual activity: Yes     Partners: Female     Review of Systems   Constitutional:  Positive for chills, diaphoresis and fever.   HENT:  Negative for congestion, hearing loss and trouble swallowing.    Eyes:  Negative for visual disturbance.   Respiratory:  Negative for cough and shortness of breath.    Cardiovascular:  Negative for chest pain, palpitations and leg swelling.   Gastrointestinal:  Positive for diarrhea and nausea. Negative for abdominal pain, blood in stool and vomiting.   Genitourinary:  Negative for difficulty urinating and hematuria.   Musculoskeletal:  Positive for myalgias. Negative for back pain.   Skin:  Negative for rash and wound.   Neurological:  Negative for dizziness, light-headedness and headaches.   Psychiatric/Behavioral:  Negative for sleep disturbance. The patient is not nervous/anxious.    Objective:     Vital Signs (Most Recent):  Temp: 98.4 °F (36.9 °C) (06/10/22 0438)  Pulse: 96 (06/10/22 0143)  Resp: (!) 24 (06/10/22 0143)  BP: 136/71 (06/10/22 0143)  SpO2: 96 % (06/10/22 0143)   Vital Signs (24h Range):  Temp:  [97.6 °F (36.4 °C)-101.4 °F (38.6 °C)] 98.4 °F (36.9 °C)  Pulse:  [] 96  Resp:  [20-32] 24  SpO2:  [96 %-99 %] 96 %  BP: (119-153)/(57-81) 136/71     Weight: 87.1 kg (192 lb)  Body mass index is 28.35 kg/m².    Physical Exam  Vitals reviewed.   Constitutional:       General: He is not in acute distress.      Appearance: Normal appearance. He is not toxic-appearing.   HENT:      Head: Normocephalic and atraumatic.      Right Ear: External ear normal.      Left Ear: External ear normal.      Nose: Nose normal.      Mouth/Throat:      Mouth: Mucous membranes are moist.      Pharynx: Oropharynx is clear.   Eyes:      General: No scleral icterus.     Extraocular Movements: Extraocular movements intact.      Conjunctiva/sclera: Conjunctivae normal.      Pupils: Pupils are equal, round, and reactive to light.   Cardiovascular:      Rate and Rhythm: Regular rhythm. Tachycardia present.      Pulses: Normal pulses.      Heart sounds: Normal heart sounds. No murmur heard.  Pulmonary:      Effort: Pulmonary effort is normal. No respiratory distress.      Breath sounds: Normal breath sounds. No wheezing, rhonchi or rales.   Abdominal:      General: Abdomen is flat. Bowel sounds are normal. There is no distension.      Palpations: Abdomen is soft.      Tenderness: There is no abdominal tenderness. There is no guarding or rebound.   Musculoskeletal:         General: No swelling. Normal range of motion.      Cervical back: Normal range of motion and neck supple. No rigidity.      Right lower leg: No edema.      Left lower leg: No edema.   Skin:     General: Skin is warm and dry.      Capillary Refill: Capillary refill takes less than 2 seconds.      Findings: No rash.   Neurological:      General: No focal deficit present.      Mental Status: He is alert and oriented to person, place, and time. Mental status is at baseline.   Psychiatric:         Mood and Affect: Mood normal.         Behavior: Behavior normal.         Thought Content: Thought content normal.         Judgment: Judgment normal.         CRANIAL NERVES     CN III, IV, VI   Pupils are equal, round, and reactive to light.     Significant Labs: All pertinent labs within the past 24 hours have been reviewed.  Recent Lab Results         06/10/22  0259   06/09/22  7160    06/09/22 2101 06/09/22 2059        Influenza B, Molecular       Negative       SED RATE (WESTERGREN)     38         Benzodiazepines Negative             Cocaine Negative             BARBITURATES Negative             Albumin/Globulin Ratio       0.7       Albumin       3.0       Alkaline Phosphatase       152       ALT       52       Amphetamine Negative             Anion Gap       9       Appearance, UA       Clear       aPTT     29.2         AST       33       Baso #       0.03       Basophil %       0.2       Bilirubin (UA)       Negative       BILIRUBIN TOTAL       0.4       BUN       6       BUN/CREAT RATIO       8       Calcium       8.8       Cannabinoid Scrn, Ur Negative             Chloride       101       CO2       27       Color, UA       Yellow       COVID-19 Ag       Negative       Creatinine       0.79       CRP     8.34         Differential Type       Auto       eGFR if non        119       Eos #       0.03       Eosinophil %       0.2       Globulin, Total       4.2       Glucose       143       Glucose, UA       Negative       Hematocrit       37.4       Hemoglobin       12.8       Immature Grans (Abs)       0.07       Immature Granulocytes       0.6       Influenza A       Negative       INR     1.11         Ketones, UA       Negative       Lactate, Marlon   1.1           Leukocytes, UA       Negative       Lymph #       0.90       Lymph %       7.2       Magnesium     1.6         MCH       26.3       MCHC       34.2       MCV       76.8       Mono #       1.25       Mono %       10.0       MPV       10.0       Neutrophils, Abs       10.27       Neutrophils Relative       81.8       NITRITE UA       Negative       nRBC       0.0       NT-proBNP     66         NUCLEATED RBC ABSOLUTE       0.00       Occult Blood UA       Negative       Opiate Scrn, Ur Positive             pH, UA       6.0       Phencyclidine Negative             Platelets       293       Potassium       3.7        PROTEIN TOTAL       7.2       Protein, UA       Negative       Protime     13.9         RBC       4.87       RDW       13.2       Sodium       133       Specific Gravity, UA       <=1.005       Troponin I High Sensitivity     8.9         UROBILINOGEN UA       0.2       WBC       12.55               Significant Imaging: I have reviewed all pertinent imaging results/findings within the past 24 hours.    Assessment/Plan:     * Fever of unknown origin  -reports fevers up to 102 F for the past 2-3 days  -Pt's vitals and suspected source of infection showed that patient meets sepsis criteria.   -ED treated with IV vancomycin 1500 mg once, tylenol, IV fluids, and IV zofran.  -Tylenol PRN for fever.  -ESR, CRP, and WBC elevated.  -blood cultures pending.  -UA and cxray negative.  -Hepatitis panel, HIV, Marlon/Chl, and RPR pending.  -ID consulted. Assistance appreciated.     Anemia  -H/H 12.8/37.4  -MCV 76.8  -iron studies pending.     Diarrhea  -reports watery diarrhea with no blood  -fecal leukocytes, enteric pathogen panel, and stool culture pending.   -IV fluids NS at 100 cc/hr    Hypomagnesemia  -Mag 1.6  -administered IV mag sulfate 2 g  -Repeat Mag pending.     IVDU (intravenous drug user)  -UDS positive for opiates.  -reports last used IV meth 2 weeks ago and the needle broke off in his right arm and also his left hand. xrays pending.   -ESR, CRP, and WBC elevated.  -echo, hepatitis panel, HIV, RPR, Marlon/chl pending.    -CT chest and CT abd/pelvis w/o contrast pending to look for abscesses.  -ID consulted. Assistance appreciated.       VTE Risk Mitigation (From admission, onward)         Ordered     IP VTE LOW RISK PATIENT  Once         06/10/22 0329     Place sequential compression device  Until discontinued         06/10/22 0329                   Soo Quinn DO  Department of Hospital Medicine   [facility] - [address] [place] [address]

## 2022-06-10 NOTE — SUBJECTIVE & OBJECTIVE
Past Medical History:   Diagnosis Date    Tinea versicolor        History reviewed. No pertinent surgical history.    Review of patient's allergies indicates:  No Known Allergies    No current facility-administered medications on file prior to encounter.     No current outpatient medications on file prior to encounter.     Family History       Problem Relation (Age of Onset)    Graves' disease Mother    Kidney failure Maternal Grandmother          Tobacco Use    Smoking status: Former Smoker     Packs/day: 1.00     Years: 10.00     Pack years: 10.00     Types: Cigarettes     Quit date: 2021     Years since quittin.0    Smokeless tobacco: Never Used   Substance and Sexual Activity    Alcohol use: Not Currently    Drug use: Yes     Types: IV, Methamphetamines    Sexual activity: Yes     Partners: Female     Review of Systems   Constitutional:  Positive for chills, diaphoresis and fever.   HENT:  Negative for congestion, hearing loss and trouble swallowing.    Eyes:  Negative for visual disturbance.   Respiratory:  Negative for cough and shortness of breath.    Cardiovascular:  Negative for chest pain, palpitations and leg swelling.   Gastrointestinal:  Positive for diarrhea and nausea. Negative for abdominal pain, blood in stool and vomiting.   Genitourinary:  Negative for difficulty urinating and hematuria.   Musculoskeletal:  Positive for myalgias. Negative for back pain.   Skin:  Negative for rash and wound.   Neurological:  Negative for dizziness, light-headedness and headaches.   Psychiatric/Behavioral:  Negative for sleep disturbance. The patient is not nervous/anxious.    Objective:     Vital Signs (Most Recent):  Temp: 98.4 °F (36.9 °C) (06/10/22 0438)  Pulse: 96 (06/10/22 0143)  Resp: (!) 24 (06/10/22 0143)  BP: 136/71 (06/10/22 0143)  SpO2: 96 % (06/10/22 0143)   Vital Signs (24h Range):  Temp:  [97.6 °F (36.4 °C)-101.4 °F (38.6 °C)] 98.4 °F (36.9 °C)  Pulse:  [] 96  Resp:  [20-32] 24  SpO2:   [96 %-99 %] 96 %  BP: (119-153)/(57-81) 136/71     Weight: 87.1 kg (192 lb)  Body mass index is 28.35 kg/m².    Physical Exam  Vitals reviewed.   Constitutional:       General: He is not in acute distress.     Appearance: Normal appearance. He is not toxic-appearing.   HENT:      Head: Normocephalic and atraumatic.      Right Ear: External ear normal.      Left Ear: External ear normal.      Nose: Nose normal.      Mouth/Throat:      Mouth: Mucous membranes are moist.      Pharynx: Oropharynx is clear.   Eyes:      General: No scleral icterus.     Extraocular Movements: Extraocular movements intact.      Conjunctiva/sclera: Conjunctivae normal.      Pupils: Pupils are equal, round, and reactive to light.   Cardiovascular:      Rate and Rhythm: Regular rhythm. Tachycardia present.      Pulses: Normal pulses.      Heart sounds: Normal heart sounds. No murmur heard.  Pulmonary:      Effort: Pulmonary effort is normal. No respiratory distress.      Breath sounds: Normal breath sounds. No wheezing, rhonchi or rales.   Abdominal:      General: Abdomen is flat. Bowel sounds are normal. There is no distension.      Palpations: Abdomen is soft.      Tenderness: There is no abdominal tenderness. There is no guarding or rebound.   Musculoskeletal:         General: No swelling. Normal range of motion.      Cervical back: Normal range of motion and neck supple. No rigidity.      Right lower leg: No edema.      Left lower leg: No edema.   Skin:     General: Skin is warm and dry.      Capillary Refill: Capillary refill takes less than 2 seconds.      Findings: No rash.   Neurological:      General: No focal deficit present.      Mental Status: He is alert and oriented to person, place, and time. Mental status is at baseline.   Psychiatric:         Mood and Affect: Mood normal.         Behavior: Behavior normal.         Thought Content: Thought content normal.         Judgment: Judgment normal.         CRANIAL NERVES     CN III,  IV, VI   Pupils are equal, round, and reactive to light.     Significant Labs: All pertinent labs within the past 24 hours have been reviewed.  Recent Lab Results         06/10/22  0259   06/09/22 2111 06/09/22 2101 06/09/22 2059        Influenza B, Molecular       Negative       SED RATE (WESTERGREN)     38         Benzodiazepines Negative             Cocaine Negative             BARBITURATES Negative             Albumin/Globulin Ratio       0.7       Albumin       3.0       Alkaline Phosphatase       152       ALT       52       Amphetamine Negative             Anion Gap       9       Appearance, UA       Clear       aPTT     29.2         AST       33       Baso #       0.03       Basophil %       0.2       Bilirubin (UA)       Negative       BILIRUBIN TOTAL       0.4       BUN       6       BUN/CREAT RATIO       8       Calcium       8.8       Cannabinoid Scrn, Ur Negative             Chloride       101       CO2       27       Color, UA       Yellow       COVID-19 Ag       Negative       Creatinine       0.79       CRP     8.34         Differential Type       Auto       eGFR if non        119       Eos #       0.03       Eosinophil %       0.2       Globulin, Total       4.2       Glucose       143       Glucose, UA       Negative       Hematocrit       37.4       Hemoglobin       12.8       Immature Grans (Abs)       0.07       Immature Granulocytes       0.6       Influenza A       Negative       INR     1.11         Ketones, UA       Negative       Lactate, Marlon   1.1           Leukocytes, UA       Negative       Lymph #       0.90       Lymph %       7.2       Magnesium     1.6         MCH       26.3       MCHC       34.2       MCV       76.8       Mono #       1.25       Mono %       10.0       MPV       10.0       Neutrophils, Abs       10.27       Neutrophils Relative       81.8       NITRITE UA       Negative       nRBC       0.0       NT-proBNP     66         NUCLEATED RBC ABSOLUTE        0.00       Occult Blood UA       Negative       Opiate Scrn, Ur Positive             pH, UA       6.0       Phencyclidine Negative             Platelets       293       Potassium       3.7       PROTEIN TOTAL       7.2       Protein, UA       Negative       Protime     13.9         RBC       4.87       RDW       13.2       Sodium       133       Specific Gravity, UA       <=1.005       Troponin I High Sensitivity     8.9         UROBILINOGEN UA       0.2       WBC       12.55               Significant Imaging: I have reviewed all pertinent imaging results/findings within the past 24 hours.

## 2022-06-10 NOTE — PLAN OF CARE
Problem: Adult Inpatient Plan of Care  Goal: Plan of Care Review  6/10/2022 0612 by Lindsey Barney LPN  Outcome: Ongoing, Progressing  6/10/2022 0610 by Lindsey Braney LPN  Outcome: Ongoing, Progressing  Goal: Patient-Specific Goal (Individualized)  6/10/2022 0612 by Lindsey Barney LPN  Outcome: Ongoing, Progressing  6/10/2022 0610 by Lindsey Barney LPN  Outcome: Ongoing, Progressing  Goal: Absence of Hospital-Acquired Illness or Injury  6/10/2022 0612 by Lindsey Barney LPN  Outcome: Ongoing, Progressing  6/10/2022 0610 by Lindsey Barney LPN  Outcome: Ongoing, Progressing  Goal: Optimal Comfort and Wellbeing  6/10/2022 0612 by Lindsey Barney LPN  Outcome: Ongoing, Progressing  6/10/2022 0610 by Lindsey Barney LPN  Outcome: Ongoing, Progressing  Goal: Readiness for Transition of Care  6/10/2022 0612 by Lindsey Barney LPN  Outcome: Ongoing, Progressing  6/10/2022 0610 by Lindsey Barney LPN  Outcome: Ongoing, Progressing     Problem: Adult Inpatient Plan of Care  Goal: Patient-Specific Goal (Individualized)  6/10/2022 0612 by Lindsey Barney LPN  Outcome: Ongoing, Progressing  6/10/2022 0610 by Lindsey Barney LPN  Outcome: Ongoing, Progressing     Problem: Adult Inpatient Plan of Care  Goal: Absence of Hospital-Acquired Illness or Injury  6/10/2022 0612 by Lindsey Barney LPN  Outcome: Ongoing, Progressing  6/10/2022 0610 by Lindsey Barney LPN  Outcome: Ongoing, Progressing     Problem: Adult Inpatient Plan of Care  Goal: Optimal Comfort and Wellbeing  6/10/2022 0612 by Lindsey Barney LPN  Outcome: Ongoing, Progressing  6/10/2022 0610 by Lindsey Barney LPN  Outcome: Ongoing, Progressing     Problem: Adult Inpatient Plan of Care  Goal: Readiness for Transition of Care  6/10/2022 0612 by Lindsey Barney LPN  Outcome: Ongoing, Progressing  6/10/2022 0610 by Lindsey Barney LPN  Outcome: Ongoing, Progressing

## 2022-06-10 NOTE — ASSESSMENT & PLAN NOTE
-reports fevers up to 102 F for the past 2-3 days  -Pt's vitals and suspected source of infection showed that patient meets sepsis criteria.   -ED treated with IV vancomycin 1500 mg once, tylenol, IV fluids, and IV zofran.  -Tylenol PRN for fever.  -ESR, CRP, and WBC elevated.  -blood cultures pending.  -UA and cxray negative.  -Hepatitis panel, HIV, Marlon/Chl, and RPR pending.  -ID consulted. Assistance appreciated.

## 2022-06-10 NOTE — PROGRESS NOTES
Pharmacokinetic Initial Assessment: IV Vancomycin    Assessment/Plan:    Initiate intravenous vancomycin 1750mg q12hrs  Desired empiric serum trough concentration is 15 to 20 mcg/mL  Draw vancomycin trough level 30 min prior to fourth dose on 6/11/22 at approximately 2000  Pharmacy will continue to follow and monitor vancomycin.      Please contact pharmacy at extension [phone] with any questions regarding this assessment.     Thank you for the consult,   Dariel Gonzalez       Patient brief summary:  Josh Aaron is a 36 y.o. male initiated on antimicrobial therapy with IV Vancomycin for treatment of suspected  infection    Drug Allergies:   Review of patient's allergies indicates:  No Known Allergies    Actual Body Weight:   87.1kg    Renal Function:   Estimated Creatinine Clearance: 165.8 mL/min (A) (based on SCr of 0.68 mg/dL (L)).,     Dialysis Method (if applicable):  N/A    CBC (last 72 hours):  Recent Labs   Lab Result Units 06/09/22  2059 06/10/22  0514   WBC K/uL 12.55* 9.07   Hemoglobin g/dL 12.8* 12.2*   Hematocrit % 37.4* 36.7*   Platelet Count K/uL 293 222   Lymphocytes % % 7.2* 10.6*   Monocytes % % 10.0* 7.8*   Eosinophils % % 0.2* 0.1*   Basophils % % 0.2 0.3       Metabolic Panel (last 72 hours):  Recent Labs   Lab Result Units 06/09/22  2059 06/09/22  2101 06/10/22  0514   Sodium mmol/L 133*  --  134*   Potassium mmol/L 3.7  --  4.1   Chloride mmol/L 101  --  99   CO2 mmol/L 27  --  24   Glucose mg/dL 143*  --  245*   Glucose, UA mg/dL Negative  --   --    BUN mg/dL 6*  --  8   Creatinine mg/dL 0.79  --  0.68*   Albumin g/dL 3.0*  --   --    Bilirubin, Total mg/dL 0.4  --   --    Alk Phos U/L 152*  --   --    AST U/L 33  --   --    ALT U/L 52  --   --    Magnesium mg/dL  --  1.6*  --        Drug levels (last 3 results):  No results for input(s): VANCOMYCINRA, VANCORANDOM, VANCOMYCINPE, VANCOPEAK, VANCOMYCINTR, VANCOTROUGH in the last 72 hours.    Microbiologic Results:  Microbiology Results (last 7  days)       Procedure Component Value Units Date/Time    Fecal leukocytes [[id number]] Collected: 06/10/22 0924    Order Status: Completed Specimen: Stool Updated: 06/10/22 1115     Fecal Leukocytes 0-5 /hpf     C Diff Toxin by PCR [[id number]] Collected: 06/10/22 1107    Order Status: Sent Specimen: Stool     Culture, Other Routine [[id number]] Collected: 06/10/22 0924    Order Status: Canceled Specimen: Medical Devices from Other, please specify Updated: 06/10/22 0931    Enteric Pathogen Panel [[id number]] Collected: 06/10/22 0924    Order Status: Sent Specimen: Stool Updated: 06/10/22 0931    Chlamydia/GC, PCR [[id number]] Collected: 06/10/22 0618    Order Status: Sent Specimen: Urine Updated: 06/10/22 0655    Enteric Pathogen Panel [[id number]] Collected: 06/10/22 0618    Order Status: Canceled Specimen: Stool Updated: 06/10/22 0654    Fecal leukocytes [[id number]] Collected: 06/10/22 0618    Order Status: Canceled Specimen: Stool Updated: 06/10/22 0654    Stool culture [[id number]] Collected: 06/10/22 0618    Order Status: Canceled Specimen: Stool Updated: 06/10/22 0654    Blood culture (site 1) [[id number]] Collected: 06/10/22 0431    Order Status: Sent Specimen: Blood Updated: 06/10/22 0438    Blood culture (site 2) [[id number]] Collected: 06/10/22 0432    Order Status: Sent Specimen: Blood Updated: 06/10/22 0438    Blood culture (site 2) [[id number]] Collected: 06/09/22 2111    Order Status: Sent Specimen: Blood Updated: 06/09/22 2111    Blood culture (site 1) [[id number]]     Order Status: Sent Specimen: Blood

## 2022-06-11 PROBLEM — E83.42 HYPOMAGNESEMIA: Status: RESOLVED | Noted: 2022-06-10 | Resolved: 2022-06-11

## 2022-06-11 PROBLEM — R50.9 FEVER OF UNKNOWN ORIGIN: Status: RESOLVED | Noted: 2022-06-10 | Resolved: 2022-06-11

## 2022-06-11 PROBLEM — A09 INFECTIOUS DIARRHEA: Status: ACTIVE | Noted: 2022-06-10

## 2022-06-11 LAB
ANION GAP SERPL CALCULATED.3IONS-SCNC: 15 MMOL/L (ref 7–16)
BASOPHILS # BLD AUTO: 0.04 K/UL (ref 0–0.2)
BASOPHILS NFR BLD AUTO: 0.7 % (ref 0–1)
BUN SERPL-MCNC: 8 MG/DL (ref 7–18)
BUN/CREAT SERPL: 12 (ref 6–20)
C DIFF TOX A+B STL IA-ACNC: NEGATIVE
CALCIUM SERPL-MCNC: 8.9 MG/DL (ref 8.5–10.1)
CHLAMYDIA BY PCR: NEGATIVE
CHLORIDE SERPL-SCNC: 106 MMOL/L (ref 98–107)
CO2 SERPL-SCNC: 26 MMOL/L (ref 21–32)
CREAT SERPL-MCNC: 0.68 MG/DL (ref 0.7–1.3)
DIFFERENTIAL METHOD BLD: ABNORMAL
EOSINOPHIL # BLD AUTO: 0.22 K/UL (ref 0–0.5)
EOSINOPHIL NFR BLD AUTO: 3.9 % (ref 1–4)
ERYTHROCYTE [DISTWIDTH] IN BLOOD BY AUTOMATED COUNT: 13.2 % (ref 11.5–14.5)
EST. AVERAGE GLUCOSE BLD GHB EST-MCNC: 100 MG/DL
GLUCOSE SERPL-MCNC: 112 MG/DL (ref 74–106)
GLUCOSE SERPL-MCNC: 122 MG/DL (ref 70–105)
GLUCOSE SERPL-MCNC: 134 MG/DL (ref 70–105)
GLUCOSE SERPL-MCNC: 96 MG/DL (ref 70–105)
HBA1C MFR BLD HPLC: 5.6 % (ref 4.5–6.6)
HCT VFR BLD AUTO: 34.9 % (ref 40–54)
HGB BLD-MCNC: 11.6 G/DL (ref 13.5–18)
IMM GRANULOCYTES # BLD AUTO: 0.04 K/UL (ref 0–0.04)
IMM GRANULOCYTES NFR BLD: 0.7 % (ref 0–0.4)
LYMPHOCYTES # BLD AUTO: 2.07 K/UL (ref 1–4.8)
LYMPHOCYTES NFR BLD AUTO: 36.7 % (ref 27–41)
MCH RBC QN AUTO: 25.5 PG (ref 27–31)
MCHC RBC AUTO-ENTMCNC: 33.2 G/DL (ref 32–36)
MCV RBC AUTO: 76.7 FL (ref 80–96)
MONOCYTES # BLD AUTO: 0.74 K/UL (ref 0–0.8)
MONOCYTES NFR BLD AUTO: 13.1 % (ref 2–6)
MPC BLD CALC-MCNC: 9.6 FL (ref 9.4–12.4)
N. GONORRHOEAE (GC) BY PCR: NEGATIVE
NEUTROPHILS # BLD AUTO: 2.53 K/UL (ref 1.8–7.7)
NEUTROPHILS NFR BLD AUTO: 44.9 % (ref 53–65)
NRBC # BLD AUTO: 0 X10E3/UL
NRBC, AUTO (.00): 0 %
PLATELET # BLD AUTO: 256 K/UL (ref 150–400)
POTASSIUM SERPL-SCNC: 4 MMOL/L (ref 3.5–5.1)
RBC # BLD AUTO: 4.55 M/UL (ref 4.6–6.2)
SODIUM SERPL-SCNC: 143 MMOL/L (ref 136–145)
VANCOMYCIN TROUGH SERPL-MCNC: 8.9 ΜG/ML (ref 10–20)
WBC # BLD AUTO: 5.64 K/UL (ref 4.5–11)

## 2022-06-11 PROCEDURE — 82962 GLUCOSE BLOOD TEST: CPT

## 2022-06-11 PROCEDURE — 80202 ASSAY OF VANCOMYCIN: CPT | Performed by: INTERNAL MEDICINE

## 2022-06-11 PROCEDURE — 36415 COLL VENOUS BLD VENIPUNCTURE: CPT

## 2022-06-11 PROCEDURE — 63700000 PHARM REV CODE 250 ALT 637 W/O HCPCS

## 2022-06-11 PROCEDURE — 80048 BASIC METABOLIC PNL TOTAL CA: CPT

## 2022-06-11 PROCEDURE — 25000003 PHARM REV CODE 250

## 2022-06-11 PROCEDURE — 63600175 PHARM REV CODE 636 W HCPCS: Performed by: INTERNAL MEDICINE

## 2022-06-11 PROCEDURE — 83036 HEMOGLOBIN GLYCOSYLATED A1C: CPT

## 2022-06-11 PROCEDURE — 63600175 PHARM REV CODE 636 W HCPCS

## 2022-06-11 PROCEDURE — 99232 SBSQ HOSP IP/OBS MODERATE 35: CPT | Mod: GC,,, | Performed by: INTERNAL MEDICINE

## 2022-06-11 PROCEDURE — 11000001 HC ACUTE MED/SURG PRIVATE ROOM

## 2022-06-11 PROCEDURE — 99232 PR SUBSEQUENT HOSPITAL CARE,LEVL II: ICD-10-PCS | Mod: GC,,, | Performed by: INTERNAL MEDICINE

## 2022-06-11 PROCEDURE — 25000003 PHARM REV CODE 250: Performed by: INTERNAL MEDICINE

## 2022-06-11 PROCEDURE — 94761 N-INVAS EAR/PLS OXIMETRY MLT: CPT

## 2022-06-11 PROCEDURE — 85025 COMPLETE CBC W/AUTO DIFF WBC: CPT

## 2022-06-11 RX ADMIN — Medication 3 CAPSULE: at 09:06

## 2022-06-11 RX ADMIN — AZITHROMYCIN MONOHYDRATE 500 MG: 250 TABLET ORAL at 09:06

## 2022-06-11 RX ADMIN — MUPIROCIN: 20 OINTMENT TOPICAL at 09:06

## 2022-06-11 RX ADMIN — Medication 3 CAPSULE: at 02:06

## 2022-06-11 RX ADMIN — VANCOMYCIN HYDROCHLORIDE 1750 MG: 1 INJECTION, POWDER, LYOPHILIZED, FOR SOLUTION INTRAVENOUS at 09:06

## 2022-06-11 RX ADMIN — Medication 3 CAPSULE: at 05:06

## 2022-06-11 RX ADMIN — VANCOMYCIN HYDROCHLORIDE 1750 MG: 1 INJECTION, POWDER, LYOPHILIZED, FOR SOLUTION INTRAVENOUS at 08:06

## 2022-06-11 RX ADMIN — PIPERACILLIN AND TAZOBACTAM 4.5 G: 4; .5 INJECTION, POWDER, LYOPHILIZED, FOR SOLUTION INTRAVENOUS at 09:06

## 2022-06-11 NOTE — PROGRESS NOTES
[address] Medicine  Progress Note    Patient Name: Josh Aaron  MRN: [id number]  Patient Class: IP- Inpatient   Admission Date: 6/9/2022  Length of Stay: 1 days  Attending Physician: Fabiola Fitch,*  Primary Care Provider: Primary Doctor No        Subjective:     Principal Problem:Infectious diarrhea        HPI:  36 y.o. male with a PMHx of tinea versicolor presented to the [facility] ED c/o fever, chills, watery diarrhea, diaphoresis, and myalgias since 2-3 days. Pt reports chest pain and generalized abdominal pain. Pt states associated symptoms of nausea. Per patient, he was cleaning someone's house and accidentally pricked himself with an old epi pen needle in his right thumb. Pt reports a history of IV drug use and reports he has used meth for the past 2 years and last time he used IV drugs was 2 weeks ago. Pt reports he has broken a needle inside his right arm and left hand which he could not take out. Pt reports he was tested positive for hepatitis C in the past and on repeat testing was told he is false positive. Pt denies any dysuria, hematemesis, hematuria, skin infections, rash, or any other complaints at this time.     In the ED, pt was treated with IV fluids NS 1L bolus, tylenol for fever, IV morphine 4 mg once, IV zofran 4 mg, and IV vancomycin X1. UA and chest xray were negative.  ESR, CRP, and WBC elevated. Pt's vitals and suspected source of infection showed that patient meets sepsis criteria. Pt will be admitted to the hospital for further evaluation and management of his fever of unknown origin.       Overview/Hospital Course:  No notes on file    Interval History: Patient was seen in the bed he is resting comfortably. He feels and looks a lot better than yesterday. No sweating, no fever or chills.     Review of Systems   Constitutional:  Negative for chills, diaphoresis and fever.   HENT:  Negative for congestion, hearing loss and trouble swallowing.     Eyes:  Negative for visual disturbance.   Respiratory:  Negative for cough and shortness of breath.    Cardiovascular:  Negative for chest pain, palpitations and leg swelling.   Gastrointestinal:  Positive for diarrhea. Negative for abdominal pain, blood in stool, nausea and vomiting.   Genitourinary:  Negative for difficulty urinating and hematuria.   Musculoskeletal:  Negative for back pain and myalgias.   Skin:  Negative for rash and wound.   Neurological:  Positive for weakness. Negative for dizziness, light-headedness and headaches.   Psychiatric/Behavioral:  Negative for sleep disturbance. The patient is not nervous/anxious.    Objective:     Vital Signs (Most Recent):  Temp: 97.6 °F (36.4 °C) (06/11/22 0400)  Pulse: 92 (06/11/22 0400)  Resp: 18 (06/11/22 0400)  BP: (!) 155/87 (06/11/22 0400)  SpO2: 99 % (06/11/22 0400)   Vital Signs (24h Range):  Temp:  [97.6 °F (36.4 °C)-98.9 °F (37.2 °C)] 97.6 °F (36.4 °C)  Pulse:  [] 92  Resp:  [18-20] 18  SpO2:  [97 %-99 %] 99 %  BP: (137-157)/(74-90) 155/87     Weight: 87.1 kg (192 lb)  Body mass index is 28.35 kg/m².  No intake or output data in the 24 hours ending 06/11/22 0822   Physical Exam  Vitals reviewed.   Constitutional:       General: He is not in acute distress.     Appearance: Normal appearance. He is not toxic-appearing.   HENT:      Head: Normocephalic and atraumatic.      Right Ear: External ear normal.      Left Ear: External ear normal.      Nose: Nose normal.      Mouth/Throat:      Mouth: Mucous membranes are moist.      Pharynx: Oropharynx is clear.   Eyes:      General: No scleral icterus.     Extraocular Movements: Extraocular movements intact.      Conjunctiva/sclera: Conjunctivae normal.      Pupils: Pupils are equal, round, and reactive to light.   Cardiovascular:      Rate and Rhythm: Regular rhythm. Tachycardia present.      Pulses: Normal pulses.      Heart sounds: Normal heart sounds. No murmur heard.  Pulmonary:      Effort:  Pulmonary effort is normal. No respiratory distress.      Breath sounds: Normal breath sounds. No wheezing, rhonchi or rales.   Abdominal:      General: Abdomen is flat. Bowel sounds are normal. There is no distension.      Palpations: Abdomen is soft.      Tenderness: There is no abdominal tenderness. There is no guarding or rebound.   Musculoskeletal:         General: No swelling. Normal range of motion.      Cervical back: Normal range of motion and neck supple. No rigidity.      Right lower leg: No edema.      Left lower leg: No edema.   Skin:     General: Skin is warm and dry.      Capillary Refill: Capillary refill takes less than 2 seconds.      Findings: No rash.   Neurological:      General: No focal deficit present.      Mental Status: He is alert and oriented to person, place, and time. Mental status is at baseline.   Psychiatric:         Mood and Affect: Mood normal.         Behavior: Behavior normal.         Thought Content: Thought content normal.         Judgment: Judgment normal.       Significant Labs: All pertinent labs within the past 24 hours have been reviewed.    Significant Imaging: I have reviewed all pertinent imaging results/findings within the past 24 hours.      Assessment/Plan:      * Infectious diarrhea  - met sepsis criteria on admission   -ED treated with IV vancomycin 1500 mg once, tylenol, IV fluids, and IV zofran. In the ED  -ESR, CRP, and WBC elevated on admission  -blood cultures no growth so far.  - Treated with IV vanc and zosyn  - enteric pathogen panel showed campylobater  - C diff pending  - Azithromycin 500 mg PO daily x3 days was added to regimen  -Hepatitis panel, HIV, Marlon/Chl, and RPR were all negative  - Treating with IV fluids  CT of abd:  1. Questionable nonspecific colitis  2. Borderline sized to mildly enlarged nonspecific abdominal and axillary nodes  3. Left nephrolithiasis  4. Soft tissue edema and fluid in the right upper chest and supraclavicular region  This  CT exam was performed using one or more of the following dose reduction techniques: Automated exposure control, adjustment of the mA and/or kV according to patient's size, or use of iterative reconstruction technique.    IVDU (intravenous drug user)  -UDS positive for opiates.  -reports last used IV meth 2 weeks ago and the needle broke off in his right arm and also his left hand. xrays pending.   -ESR, CRP, and WBC elevated.  -echo- showed no endocarditis  - hepatitis panel, HIV, RPR, Marlon/chl all negative.    - Patient has broken needles in the right forearm and base of the left thumb seen on xray. Patient would need outpatient follow up with a palstic surgeon or possibly a dermatologist to take these needles out  - [facility] was contacted for rehab placement but patient denied    Anemia  -H/H 12.8/37.4  -MCV 76.8  -iron studies pending.       VTE Risk Mitigation (From admission, onward)         Ordered     IP VTE LOW RISK PATIENT  Once         06/10/22 0329     Place sequential compression device  Until discontinued         06/10/22 0329                Discharge Planning   RAMON:      Code Status: Full Code   Is the patient medically ready for discharge?:     Reason for patient still in hospital (select all that apply): Treatment  Discharge Plan A: Home with family                  Mariano Cotton MD  Department of Hospital Medicine   [address]

## 2022-06-11 NOTE — ASSESSMENT & PLAN NOTE
- met sepsis criteria on admission   -ED treated with IV vancomycin 1500 mg once, tylenol, IV fluids, and IV zofran. In the ED  -ESR, CRP, and WBC elevated on admission  -blood cultures no growth so far.  - Treated with IV vanc and zosyn  - enteric pathogen panel showed campylobater  - C diff pending  - Azithromycin 500 mg PO daily x3 days was added to regimen  -Hepatitis panel, HIV, Marlon/Chl, and RPR were all negative  - Treating with IV fluids  CT of abd:  1. Questionable nonspecific colitis  2. Borderline sized to mildly enlarged nonspecific abdominal and axillary nodes  3. Left nephrolithiasis  4. Soft tissue edema and fluid in the right upper chest and supraclavicular region  This CT exam was performed using one or more of the following dose reduction techniques: Automated exposure control, adjustment of the mA and/or kV according to patient's size, or use of iterative reconstruction technique.

## 2022-06-11 NOTE — ASSESSMENT & PLAN NOTE
-UDS positive for opiates.  -reports last used IV meth 2 weeks ago and the needle broke off in his right arm and also his left hand. xrays pending.   -ESR, CRP, and WBC elevated.  -echo- showed no endocarditis  - hepatitis panel, HIV, RPR, Marlon/chl all negative.    - Patient has broken needles in the right forearm and base of the left thumb seen on xray. Patient would need outpatient follow up with a palstic surgeon or possibly a dermatologist to take these needles out  -  was contacted for rehab placement but patient denied

## 2022-06-11 NOTE — SUBJECTIVE & OBJECTIVE
Interval History: Patient was seen in the bed he is resting comfortably. He feels and looks a lot better than yesterday. No sweating, no fever or chills.     Review of Systems   Constitutional:  Negative for chills, diaphoresis and fever.   HENT:  Negative for congestion, hearing loss and trouble swallowing.    Eyes:  Negative for visual disturbance.   Respiratory:  Negative for cough and shortness of breath.    Cardiovascular:  Negative for chest pain, palpitations and leg swelling.   Gastrointestinal:  Positive for diarrhea. Negative for abdominal pain, blood in stool, nausea and vomiting.   Genitourinary:  Negative for difficulty urinating and hematuria.   Musculoskeletal:  Negative for back pain and myalgias.   Skin:  Negative for rash and wound.   Neurological:  Positive for weakness. Negative for dizziness, light-headedness and headaches.   Psychiatric/Behavioral:  Negative for sleep disturbance. The patient is not nervous/anxious.    Objective:     Vital Signs (Most Recent):  Temp: 97.6 °F (36.4 °C) (06/11/22 0400)  Pulse: 92 (06/11/22 0400)  Resp: 18 (06/11/22 0400)  BP: (!) 155/87 (06/11/22 0400)  SpO2: 99 % (06/11/22 0400)   Vital Signs (24h Range):  Temp:  [97.6 °F (36.4 °C)-98.9 °F (37.2 °C)] 97.6 °F (36.4 °C)  Pulse:  [] 92  Resp:  [18-20] 18  SpO2:  [97 %-99 %] 99 %  BP: (137-157)/(74-90) 155/87     Weight: 87.1 kg (192 lb)  Body mass index is 28.35 kg/m².  No intake or output data in the 24 hours ending 06/11/22 0822   Physical Exam  Vitals reviewed.   Constitutional:       General: He is not in acute distress.     Appearance: Normal appearance. He is not toxic-appearing.   HENT:      Head: Normocephalic and atraumatic.      Right Ear: External ear normal.      Left Ear: External ear normal.      Nose: Nose normal.      Mouth/Throat:      Mouth: Mucous membranes are moist.      Pharynx: Oropharynx is clear.   Eyes:      General: No scleral icterus.     Extraocular Movements: Extraocular  movements intact.      Conjunctiva/sclera: Conjunctivae normal.      Pupils: Pupils are equal, round, and reactive to light.   Cardiovascular:      Rate and Rhythm: Regular rhythm. Tachycardia present.      Pulses: Normal pulses.      Heart sounds: Normal heart sounds. No murmur heard.  Pulmonary:      Effort: Pulmonary effort is normal. No respiratory distress.      Breath sounds: Normal breath sounds. No wheezing, rhonchi or rales.   Abdominal:      General: Abdomen is flat. Bowel sounds are normal. There is no distension.      Palpations: Abdomen is soft.      Tenderness: There is no abdominal tenderness. There is no guarding or rebound.   Musculoskeletal:         General: No swelling. Normal range of motion.      Cervical back: Normal range of motion and neck supple. No rigidity.      Right lower leg: No edema.      Left lower leg: No edema.   Skin:     General: Skin is warm and dry.      Capillary Refill: Capillary refill takes less than 2 seconds.      Findings: No rash.   Neurological:      General: No focal deficit present.      Mental Status: He is alert and oriented to person, place, and time. Mental status is at baseline.   Psychiatric:         Mood and Affect: Mood normal.         Behavior: Behavior normal.         Thought Content: Thought content normal.         Judgment: Judgment normal.       Significant Labs: All pertinent labs within the past 24 hours have been reviewed.    Significant Imaging: I have reviewed all pertinent imaging results/findings within the past 24 hours.

## 2022-06-12 VITALS
BODY MASS INDEX: 28.44 KG/M2 | SYSTOLIC BLOOD PRESSURE: 142 MMHG | DIASTOLIC BLOOD PRESSURE: 90 MMHG | RESPIRATION RATE: 18 BRPM | HEART RATE: 75 BPM | OXYGEN SATURATION: 97 % | HEIGHT: 69 IN | TEMPERATURE: 98 F | WEIGHT: 192 LBS

## 2022-06-12 LAB
ANION GAP SERPL CALCULATED.3IONS-SCNC: 15 MMOL/L (ref 7–16)
BASOPHILS # BLD AUTO: 0.04 K/UL (ref 0–0.2)
BASOPHILS NFR BLD AUTO: 0.7 % (ref 0–1)
BUN SERPL-MCNC: 8 MG/DL (ref 7–18)
BUN/CREAT SERPL: 11 (ref 6–20)
CALCIUM SERPL-MCNC: 8.8 MG/DL (ref 8.5–10.1)
CHLORIDE SERPL-SCNC: 105 MMOL/L (ref 98–107)
CO2 SERPL-SCNC: 24 MMOL/L (ref 21–32)
CREAT SERPL-MCNC: 0.71 MG/DL (ref 0.7–1.3)
DIFFERENTIAL METHOD BLD: ABNORMAL
EOSINOPHIL # BLD AUTO: 0.36 K/UL (ref 0–0.5)
EOSINOPHIL NFR BLD AUTO: 6 % (ref 1–4)
ERYTHROCYTE [DISTWIDTH] IN BLOOD BY AUTOMATED COUNT: 13.3 % (ref 11.5–14.5)
GLUCOSE SERPL-MCNC: 154 MG/DL (ref 70–105)
GLUCOSE SERPL-MCNC: 159 MG/DL (ref 74–106)
HCT VFR BLD AUTO: 36.4 % (ref 40–54)
HGB BLD-MCNC: 12 G/DL (ref 13.5–18)
IMM GRANULOCYTES # BLD AUTO: 0.03 K/UL (ref 0–0.04)
IMM GRANULOCYTES NFR BLD: 0.5 % (ref 0–0.4)
LYMPHOCYTES # BLD AUTO: 2.62 K/UL (ref 1–4.8)
LYMPHOCYTES NFR BLD AUTO: 43.7 % (ref 27–41)
MAGNESIUM SERPL-MCNC: 1.8 MG/DL (ref 1.7–2.3)
MCH RBC QN AUTO: 25.9 PG (ref 27–31)
MCHC RBC AUTO-ENTMCNC: 33 G/DL (ref 32–36)
MCV RBC AUTO: 78.6 FL (ref 80–96)
MONOCYTES # BLD AUTO: 0.6 K/UL (ref 0–0.8)
MONOCYTES NFR BLD AUTO: 10 % (ref 2–6)
MPC BLD CALC-MCNC: 10 FL (ref 9.4–12.4)
NEUTROPHILS # BLD AUTO: 2.34 K/UL (ref 1.8–7.7)
NEUTROPHILS NFR BLD AUTO: 39.1 % (ref 53–65)
NRBC # BLD AUTO: 0 X10E3/UL
NRBC, AUTO (.00): 0 %
PLATELET # BLD AUTO: 320 K/UL (ref 150–400)
POTASSIUM SERPL-SCNC: 3.4 MMOL/L (ref 3.5–5.1)
RBC # BLD AUTO: 4.63 M/UL (ref 4.6–6.2)
SODIUM SERPL-SCNC: 141 MMOL/L (ref 136–145)
WBC # BLD AUTO: 5.99 K/UL (ref 4.5–11)

## 2022-06-12 PROCEDURE — 25000003 PHARM REV CODE 250

## 2022-06-12 PROCEDURE — 83735 ASSAY OF MAGNESIUM: CPT

## 2022-06-12 PROCEDURE — 36415 COLL VENOUS BLD VENIPUNCTURE: CPT

## 2022-06-12 PROCEDURE — 99239 HOSP IP/OBS DSCHRG MGMT >30: CPT | Mod: GC,,, | Performed by: INTERNAL MEDICINE

## 2022-06-12 PROCEDURE — 63700000 PHARM REV CODE 250 ALT 637 W/O HCPCS

## 2022-06-12 PROCEDURE — 85025 COMPLETE CBC W/AUTO DIFF WBC: CPT

## 2022-06-12 PROCEDURE — 82962 GLUCOSE BLOOD TEST: CPT

## 2022-06-12 PROCEDURE — 63600175 PHARM REV CODE 636 W HCPCS: Performed by: INTERNAL MEDICINE

## 2022-06-12 PROCEDURE — 25000003 PHARM REV CODE 250: Performed by: INTERNAL MEDICINE

## 2022-06-12 PROCEDURE — 80048 BASIC METABOLIC PNL TOTAL CA: CPT

## 2022-06-12 PROCEDURE — 99239 PR HOSPITAL DISCHARGE DAY,>30 MIN: ICD-10-PCS | Mod: GC,,, | Performed by: INTERNAL MEDICINE

## 2022-06-12 RX ORDER — POTASSIUM CHLORIDE 20 MEQ/1
40 TABLET, EXTENDED RELEASE ORAL ONCE
Status: COMPLETED | OUTPATIENT
Start: 2022-06-12 | End: 2022-06-12

## 2022-06-12 RX ADMIN — SODIUM CHLORIDE: 9 INJECTION, SOLUTION INTRAVENOUS at 05:06

## 2022-06-12 RX ADMIN — Medication 3 CAPSULE: at 09:06

## 2022-06-12 RX ADMIN — POTASSIUM CHLORIDE 40 MEQ: 20 TABLET, EXTENDED RELEASE ORAL at 09:06

## 2022-06-12 RX ADMIN — VANCOMYCIN HYDROCHLORIDE 1750 MG: 5 INJECTION, POWDER, LYOPHILIZED, FOR SOLUTION INTRAVENOUS at 05:06

## 2022-06-12 RX ADMIN — MUPIROCIN: 20 OINTMENT TOPICAL at 09:06

## 2022-06-12 RX ADMIN — AZITHROMYCIN MONOHYDRATE 500 MG: 250 TABLET ORAL at 09:06

## 2022-06-12 NOTE — DISCHARGE SUMMARY
[address] Medicine  Discharge Summary      Patient Name: Josh Aaron  MRN: [id number]  Patient Class: IP- Inpatient  Admission Date: 6/9/2022  Hospital Length of Stay: 2 days  Discharge Date and Time:  06/12/2022 1:30 PM  Attending Physician: Fabiola Fitch,*   Discharging Provider: Hieu Foster MD  Primary Care Provider: Primary Doctor No      HPI:   36 y.o. male with a PMHx of tinea versicolor presented to the [facility] ED c/o fever, chills, watery diarrhea, diaphoresis, and myalgias since 2-3 days. Pt reports chest pain and generalized abdominal pain. Pt states associated symptoms of nausea. Per patient, he was cleaning someone's house and accidentally pricked himself with an old epi pen needle in his right thumb. Pt reports a history of IV drug use and reports he has used meth for the past 2 years and last time he used IV drugs was 2 weeks ago. Pt reports he has broken a needle inside his right arm and left hand which he could not take out. Pt reports he was tested positive for hepatitis C in the past and on repeat testing was told he is false positive. Pt denies any dysuria, hematemesis, hematuria, skin infections, rash, or any other complaints at this time.     In the ED, pt was treated with IV fluids NS 1L bolus, tylenol for fever, IV morphine 4 mg once, IV zofran 4 mg, and IV vancomycin X1. UA and chest xray were negative.  ESR, CRP, and WBC elevated. Pt's vitals and suspected source of infection showed that patient meets sepsis criteria. Pt will be admitted to the hospital for further evaluation and management of his fever of unknown origin.       * No surgery found *      Hospital Course:   No notes on file     Goals of Care Treatment Preferences:  Code Status: Full Code      Consults:   Consults (From admission, onward)        Status Ordering Provider     Pharmacy to dose Vancomycin consult  Once        Provider:  (Not yet assigned)    Acknowledged  GIANFRANCO MACDONALD     Inpatient consult to Social Work  Once        Provider:  (Not yet assigned)    Completed PHAM REMY          * Infectious diarrhea  - met sepsis criteria on admission   -ED treated with IV vancomycin 1500 mg once, tylenol, IV fluids, and IV zofran. In the ED  -ESR, CRP, and WBC elevated on admission  -blood cultures no growth so far.  - Treated with IV vanc and zosyn  - enteric pathogen panel showed campylobater  - C diff pending  - Azithromycin 500 mg PO daily x3 days was added to regimen  -Hepatitis panel, HIV, Marlon/Chl, and RPR were all negative  - Treating with IV fluids  CT of abd:  1. Questionable nonspecific colitis  2. Borderline sized to mildly enlarged nonspecific abdominal and axillary nodes  3. Left nephrolithiasis  4. Soft tissue edema and fluid in the right upper chest and supraclavicular region  This CT exam was performed using one or more of the following dose reduction techniques: Automated exposure control, adjustment of the mA and/or kV according to patient's size, or use of iterative reconstruction technique.    Anemia  -H/H 12.8/37.4  -MCV 76.8  -iron studies pending.     IVDU (intravenous drug user)  -UDS positive for opiates.  -reports last used IV meth 2 weeks ago and the needle broke off in his right arm and also his left hand. xrays pending.   -ESR, CRP, and WBC elevated.  -echo- showed no endocarditis  - hepatitis panel, HIV, RPR, Marlon/chl all negative.    - Patient has broken needles in the right forearm and base of the left thumb seen on xray. Patient would need outpatient follow up with a palstic surgeon or possibly a dermatologist to take these needles out  -  was contacted for rehab placement but patient denied    Hypomagnesemia-resolved as of 6/11/2022  -Mag 1.6  -administered IV mag sulfate 2 g  -Repeat Mag pending.     Fever of unknown origin-resolved as of 6/11/2022  -reports fevers up to 102 F for the past 2-3 days  -Pt's vitals and suspected  source of infection showed that patient meets sepsis criteria.   -ED treated with IV vancomycin 1500 mg once, tylenol, IV fluids, and IV zofran.  -Tylenol PRN for fever.  -ESR, CRP, and WBC elevated.  -blood cultures pending.  -UA and cxray negative.  -Hepatitis panel, HIV, Marlon/Chl, and RPR pending.  -ID consulted. Assistance appreciated.       Final Active Diagnoses:    Diagnosis Date Noted POA    PRINCIPAL PROBLEM:  Infectious diarrhea [A09] 06/10/2022 Yes    IVDU (intravenous drug user) [F19.90] 06/10/2022 Yes    Anemia [D64.9] 06/10/2022 Yes      Problems Resolved During this Admission:    Diagnosis Date Noted Date Resolved POA    Fever of unknown origin [R50.9] 06/10/2022 06/11/2022 Yes    Hypomagnesemia [E83.42] 06/10/2022 06/11/2022 Yes       Discharged Condition: good    Disposition: Home or Self Care    Follow Up:   Follow-up Information     Primary Doctor No Follow up in 1 week(s).                     Patient Instructions:   No discharge procedures on file.    Significant Diagnostic Studies: Labs:   BMP:   Recent Labs   Lab 06/11/22 0618 06/12/22 0511 06/12/22  0818   * 159*  --     141  --    K 4.0 3.4*  --     105  --    CO2 26 24  --    BUN 8 8  --    CREATININE 0.68* 0.71  --    CALCIUM 8.9 8.8  --    MG  --   --  1.8    and CBC   Recent Labs   Lab 06/11/22 0618 06/12/22  0511   WBC 5.64 5.99   HGB 11.6* 12.0*   HCT 34.9* 36.4*    320       Pending Diagnostic Studies:     Procedure Component Value Units Date/Time    Ferritin [[id number]]     Order Status: Sent Lab Status: No result     Specimen: Blood     HIV 1/2 Ag/Ab (4th Gen) [[id number]]     Order Status: Sent Lab Status: No result     Specimen: Blood     Hepatitis Panel, Acute [[id number]]     Order Status: Sent Lab Status: No result     Specimen: Blood     Iron and TIBC [[id number]]     Order Status: Sent Lab Status: No result     Specimen: Blood     Magnesium [[id number]]     Order Status: Sent Lab Status: No result      Specimen: Blood     Syphilis Antibody with reflex to RPR [[id number]]     Order Status: Sent Lab Status: No result     Specimen: Blood          Medications:  Reconciled Home Medications:      Medication List      You have not been prescribed any medications.         Indwelling Lines/Drains at time of discharge:   Lines/Drains/Airways     None                 Time spent on the discharge of patient: 40 minutes         Hieu Foster MD  Department of Hospital Medicine  [address]

## 2022-06-12 NOTE — PROGRESS NOTES
Trough below desired range. Vancomycin frequency adjusted to every 8 hours. Pharmacy to follow and continue to make necessary adjustments. Thank you.

## 2022-06-13 LAB — GLUCOSE SERPL-MCNC: 260 MG/DL (ref 70–105)

## 2022-06-15 LAB — BACTERIA BLD CULT: NORMAL

## 2022-06-16 LAB
BACTERIA BLD CULT: NORMAL
BACTERIA BLD CULT: NORMAL

## 2022-12-05 ENCOUNTER — CLINICAL SUPPORT (OUTPATIENT)
Dept: PRIMARY CARE CLINIC | Facility: CLINIC | Age: 37
End: 2022-12-05

## 2022-12-05 DIAGNOSIS — Z02.4 ENCOUNTER FOR DEPARTMENT OF TRANSPORTATION (DOT) EXAMINATION FOR DRIVING LICENSE RENEWAL: Primary | ICD-10-CM

## 2022-12-05 PROCEDURE — 99499 UNLISTED E&M SERVICE: CPT | Mod: ,,, | Performed by: NURSE PRACTITIONER

## 2022-12-05 PROCEDURE — 99499 PR PHYSICAL - DOT/CDL: ICD-10-PCS | Mod: ,,, | Performed by: NURSE PRACTITIONER

## 2022-12-05 NOTE — PROGRESS NOTES
Subjective:       Patient ID: Josh Aaron is a 37 y.o. male.    Chief Complaint: No chief complaint on file.    HPI  Review of Systems      Objective:      Physical Exam    Assessment:       Problem List Items Addressed This Visit    None  Visit Diagnoses       Encounter for Department of Transportation (DOT) examination for driving license renewal    -  Primary            Plan:       See scanned documents in .

## 2025-07-31 ENCOUNTER — CLINICAL SUPPORT (OUTPATIENT)
Dept: PRIMARY CARE CLINIC | Facility: CLINIC | Age: 40
End: 2025-07-31

## 2025-07-31 DIAGNOSIS — Z01.10 ENCOUNTER FOR HEARING EXAMINATION, UNSPECIFIED WHETHER ABNORMAL FINDINGS: ICD-10-CM

## 2025-07-31 DIAGNOSIS — Z02.83 ENCOUNTER FOR DRUG SCREENING: ICD-10-CM

## 2025-07-31 DIAGNOSIS — Z00.8 ENCOUNTER FOR PULMONARY FUNCTION TESTING: ICD-10-CM

## 2025-07-31 DIAGNOSIS — Z46.89 ENCOUNTER FOR FITTING AND ADJUSTMENT OF OTHER SPECIFIED DEVICES: ICD-10-CM

## 2025-07-31 DIAGNOSIS — Z01.00 ENCOUNTER FOR VISION SCREENING: ICD-10-CM

## 2025-07-31 DIAGNOSIS — Z02.4 ENCOUNTER FOR DEPARTMENT OF TRANSPORTATION (DOT) EXAMINATION FOR DRIVING LICENSE RENEWAL: Primary | ICD-10-CM

## 2025-07-31 NOTE — PROGRESS NOTES
Patient ID: Josh Aaron is a 40 y.o. male.    Chief Complaint: No chief complaint on file.    History of Present Illness              Physical Exam              Assessment & Plan               1. Encounter for Department of Transportation (DOT) examination for driving license renewal    2. Encounter for fitting and adjustment of other specified devices    3. Encounter for hearing examination, unspecified whether abnormal findings    4. Encounter for drug screening    5. Encounter for vision screening    6. Encounter for pulmonary function testing        No follow-ups on file.    This note was generated with the assistance of ambient listening technology. Verbal consent was obtained by the patient and accompanying visitor(s) for the recording of patient appointment to facilitate this note. I attest to having reviewed and edited the generated note for accuracy, though some syntax or spelling errors may persist. Please contact the author of this note for any clarification.